# Patient Record
Sex: MALE | Race: WHITE
[De-identification: names, ages, dates, MRNs, and addresses within clinical notes are randomized per-mention and may not be internally consistent; named-entity substitution may affect disease eponyms.]

---

## 2019-12-10 ENCOUNTER — HOSPITAL ENCOUNTER (OUTPATIENT)
Dept: HOSPITAL 88 - OR | Age: 64
Discharge: HOME | End: 2019-12-10
Attending: PODIATRIST
Payer: COMMERCIAL

## 2019-12-10 VITALS — SYSTOLIC BLOOD PRESSURE: 132 MMHG | DIASTOLIC BLOOD PRESSURE: 81 MMHG

## 2019-12-10 DIAGNOSIS — G57.61: Primary | ICD-10-CM

## 2019-12-10 DIAGNOSIS — Z88.8: ICD-10-CM

## 2019-12-10 DIAGNOSIS — Z88.5: ICD-10-CM

## 2019-12-10 DIAGNOSIS — G47.33: ICD-10-CM

## 2019-12-10 PROCEDURE — 93005 ELECTROCARDIOGRAM TRACING: CPT

## 2019-12-10 PROCEDURE — 28080 REMOVAL OF FOOT LESION: CPT

## 2019-12-10 NOTE — OPERATIVE REPORT
DATE OF PROCEDURE:  12/10/2019

 

SURGEON:  JAM Chavez DPM ([facility])

 

PREOPERATIVE DIAGNOSIS:  Neuroma, second interspace, right and neuroma third interspace,

right. 

 

POSTOPERATIVE DIAGNOSIS:  Neuroma, second interspace, right and neuroma third

interspace, right. 

 

OPERATIVE PROCEDURE:  Excision of neuroma, second interspace, right and excision of

neuroma, third interspace, right. 

 

DESCRIPTION OF PROCEDURE:  The patient was placed on the OR table in the supine

position.  The right lower extremity was prepped and draped in the usual manner.  A MAC

anesthesia was used and a local anesthetic consisting of 15 mL of 0.5 Marcaine.

Hemostasis accomplished with a pneumatic cuff set at 250 mmHg at ankle level. 

 

Procedure #1:  Excision of neuroma second interspace.  An incision approximately 3 cm in

length was made on the interspace between the second and third toe.  The incision was

deepened.  Blood vessels were either ligated or retracted.  The nerve was identified and

isolated.  Both distal branches were resected and then the neuroma was removed in total.

 The wound was flushed with sterile saline solution.  Subcutaneous tissue closed with

3-0 Vicryl and skin with 4-0 nylon. 

 

Procedure #2:  Excision of neuroma third interspace of the right foot.  An incision

approximately 3 cm in length was made on the interspace between the second and third

toe.  The incision was deepened.  Blood vessels were either ligated or retracted.  The

nerve was identified and isolated.  Both distal branches were resected and then the

neuroma was removed in total.  The wound was flushed with sterile saline solution.

Subcutaneous tissue closed with 3-0 Vicryl and skin with 4-0 nylon. 

 

Following the surgery, a sterile compression dressing was applied.  At this time, the

pneumatic cuff was released and a reflex hyperemia was observed to all digits.  The

patient tolerated the procedure and anesthesia well and left the OR to recovery in good

condition with vital signs stable. 

 

 

 

 

______________________________

JAM Chavez DPM ([facility])

 

/MODL

D:  12/10/2019 08:36:26

T:  12/10/2019 15:56:51

Job #:  [id number]